# Patient Record
Sex: FEMALE | Race: WHITE | NOT HISPANIC OR LATINO | Employment: UNEMPLOYED | URBAN - METROPOLITAN AREA
[De-identification: names, ages, dates, MRNs, and addresses within clinical notes are randomized per-mention and may not be internally consistent; named-entity substitution may affect disease eponyms.]

---

## 2020-01-13 ENCOUNTER — TELEPHONE (OUTPATIENT)
Dept: NEUROLOGY | Facility: CLINIC | Age: 55
End: 2020-01-13

## 2020-01-13 ENCOUNTER — TRANSCRIBE ORDERS (OUTPATIENT)
Dept: NEUROLOGY | Facility: CLINIC | Age: 55
End: 2020-01-13

## 2020-01-13 DIAGNOSIS — R51.9 HEADACHE: Primary | ICD-10-CM

## 2020-01-13 NOTE — TELEPHONE ENCOUNTER
Called patient to see if she would be available to come in tomorrow January 14 at 9 to see Dr Evalee Gowers  Patient came in the office today and stated that she needed a sooner appointment  No answer  Left voicemail  The spot is currently on hold if the patient is available to come in

## 2020-01-14 ENCOUNTER — CONSULT (OUTPATIENT)
Dept: NEUROLOGY | Facility: CLINIC | Age: 55
End: 2020-01-14
Payer: COMMERCIAL

## 2020-01-14 VITALS
DIASTOLIC BLOOD PRESSURE: 78 MMHG | HEIGHT: 70 IN | BODY MASS INDEX: 35.79 KG/M2 | SYSTOLIC BLOOD PRESSURE: 120 MMHG | HEART RATE: 72 BPM | WEIGHT: 250 LBS

## 2020-01-14 DIAGNOSIS — M54.42 CHRONIC BILATERAL LOW BACK PAIN WITH BILATERAL SCIATICA: ICD-10-CM

## 2020-01-14 DIAGNOSIS — G44.89 OTHER HEADACHE SYNDROME: Primary | ICD-10-CM

## 2020-01-14 DIAGNOSIS — R51.9 HEADACHE: ICD-10-CM

## 2020-01-14 DIAGNOSIS — G89.29 CHRONIC BILATERAL LOW BACK PAIN WITH BILATERAL SCIATICA: ICD-10-CM

## 2020-01-14 DIAGNOSIS — M54.12 CERVICAL RADICULOPATHY: ICD-10-CM

## 2020-01-14 DIAGNOSIS — M54.41 CHRONIC BILATERAL LOW BACK PAIN WITH BILATERAL SCIATICA: ICD-10-CM

## 2020-01-14 PROBLEM — M54.40 CHRONIC BILATERAL LOW BACK PAIN WITH SCIATICA: Status: ACTIVE | Noted: 2020-01-14

## 2020-01-14 PROCEDURE — 99204 OFFICE O/P NEW MOD 45 MIN: CPT | Performed by: PSYCHIATRY & NEUROLOGY

## 2020-01-14 RX ORDER — EZETIMIBE 10 MG/1
1 TABLET ORAL DAILY
COMMUNITY
Start: 2014-12-17

## 2020-01-14 RX ORDER — MELOXICAM 15 MG/1
1 TABLET ORAL DAILY
COMMUNITY
Start: 2020-01-06 | End: 2020-03-05 | Stop reason: SINTOL

## 2020-01-14 RX ORDER — LEVOTHYROXINE SODIUM 0.12 MG/1
1 TABLET ORAL DAILY
COMMUNITY
Start: 2014-11-16

## 2020-01-14 RX ORDER — GABAPENTIN 100 MG/1
100 CAPSULE ORAL
Qty: 30 CAPSULE | Refills: 4 | Status: SHIPPED | OUTPATIENT
Start: 2020-01-14 | End: 2020-06-15

## 2020-01-14 RX ORDER — ROSUVASTATIN CALCIUM 10 MG/1
1 TABLET, COATED ORAL DAILY
COMMUNITY
Start: 2014-12-17
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
## 2020-01-20 ENCOUNTER — PROCEDURE VISIT (OUTPATIENT)
Dept: NEUROLOGY | Facility: CLINIC | Age: 55
End: 2020-01-20
Payer: COMMERCIAL

## 2020-01-20 DIAGNOSIS — M54.12 CERVICAL RADICULOPATHY: ICD-10-CM

## 2020-01-20 PROCEDURE — 95910 NRV CNDJ TEST 7-8 STUDIES: CPT | Performed by: PHYSICAL MEDICINE & REHABILITATION
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
## 2020-01-28 ENCOUNTER — EVALUATION (OUTPATIENT)
Dept: PHYSICAL THERAPY | Facility: CLINIC | Age: 55
End: 2020-01-28
Payer: COMMERCIAL

## 2020-01-28 ENCOUNTER — HOSPITAL ENCOUNTER (OUTPATIENT)
Dept: MRI IMAGING | Facility: CLINIC | Age: 55
Discharge: HOME | End: 2020-01-28
Payer: COMMERCIAL

## 2020-01-28 DIAGNOSIS — M54.42 CHRONIC BILATERAL LOW BACK PAIN WITH BILATERAL SCIATICA: ICD-10-CM

## 2020-01-28 DIAGNOSIS — G44.89 OTHER HEADACHE SYNDROME: ICD-10-CM

## 2020-01-28 DIAGNOSIS — M54.41 CHRONIC BILATERAL LOW BACK PAIN WITH BILATERAL SCIATICA: ICD-10-CM

## 2020-01-28 DIAGNOSIS — G89.29 CHRONIC BILATERAL LOW BACK PAIN WITH BILATERAL SCIATICA: ICD-10-CM

## 2020-01-28 DIAGNOSIS — M54.12 CERVICAL RADICULOPATHY: Primary | ICD-10-CM

## 2020-01-28 DIAGNOSIS — M54.12 CERVICAL RADICULOPATHY: ICD-10-CM

## 2020-01-28 PROCEDURE — 70551 MRI BRAIN STEM W/O DYE: CPT

## 2020-01-28 PROCEDURE — 97140 MANUAL THERAPY 1/> REGIONS: CPT | Performed by: PHYSICAL THERAPIST

## 2020-01-28 PROCEDURE — 97161 PT EVAL LOW COMPLEX 20 MIN: CPT | Performed by: PHYSICAL THERAPIST

## 2020-01-28 PROCEDURE — 72141 MRI NECK SPINE W/O DYE: CPT
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
## 2020-02-03 ENCOUNTER — OFFICE VISIT (OUTPATIENT)
Dept: PHYSICAL THERAPY | Facility: CLINIC | Age: 55
End: 2020-02-03
Payer: COMMERCIAL

## 2020-02-03 DIAGNOSIS — M54.42 CHRONIC BILATERAL LOW BACK PAIN WITH BILATERAL SCIATICA: ICD-10-CM

## 2020-02-03 DIAGNOSIS — M54.41 CHRONIC BILATERAL LOW BACK PAIN WITH BILATERAL SCIATICA: ICD-10-CM

## 2020-02-03 DIAGNOSIS — G89.29 CHRONIC BILATERAL LOW BACK PAIN WITH BILATERAL SCIATICA: ICD-10-CM

## 2020-02-03 DIAGNOSIS — M54.12 CERVICAL RADICULOPATHY: Primary | ICD-10-CM

## 2020-02-03 PROCEDURE — 97140 MANUAL THERAPY 1/> REGIONS: CPT | Performed by: PHYSICAL THERAPIST

## 2020-02-03 PROCEDURE — 97112 NEUROMUSCULAR REEDUCATION: CPT | Performed by: PHYSICAL THERAPIST

## 2020-02-03 NOTE — PROGRESS NOTES
Daily Note     Today's date: 2/3/2020  Patient name: Corin Hicks  : 1965  MRN: [id number]  Referring provider: Danae Wise MD  Dx:   Encounter Diagnosis     ICD-10-CM    1  Cervical radiculopathy M54 12    2  Chronic bilateral low back pain with bilateral sciatica M54 42     M54 41     G89 29                   Subjective: Pt reports that she was very sore after her IE that she was waking up t/o the night  Objective: See treatment diary below  Repeated cervical retraction: NE   Repeated cervical retraction ext: NE     DNF: 11s  Craniocervical flexion test: 22 mmHG, 24 mmHG, unable to hold at 26 mmHG    Assessment: Assessed repeated cervical movement which had no effect on patients symptoms, sx location, or comparable sign of L lateral flexion and R rotation  Pt demonstrated difficulty with activation of the deep cervical neck flexors and was very fatigued with strengthening  Pt was instructed on a HEP that focused on improving coordination of the deep neck flexors and periscapular musculature to improve posture  Plan: Continue per plan of care  Progress treatment as tolerated         Precautions: depression       Manual  1/28 2/3           Prone thoracic mobilizations KB gr 2            STM cspine paraspinals & UT  KB                                                       Exercise Diary  1/28 2/3                        Thoracic ext over a chair 10s x10 10s x10           DNF   3x 10s           scap retraction c 6s holds  x20                                                                                                                                                                                                                               Modalities  1/28 2/3

## 2020-02-04 ENCOUNTER — TELEPHONE (OUTPATIENT)
Dept: NEUROLOGY | Facility: CLINIC | Age: 55
End: 2020-02-04

## 2020-02-07 ENCOUNTER — OFFICE VISIT (OUTPATIENT)
Dept: PHYSICAL THERAPY | Facility: CLINIC | Age: 55
End: 2020-02-07
Payer: COMMERCIAL

## 2020-02-07 DIAGNOSIS — M54.12 CERVICAL RADICULOPATHY: Primary | ICD-10-CM

## 2020-02-07 DIAGNOSIS — M54.42 CHRONIC BILATERAL LOW BACK PAIN WITH BILATERAL SCIATICA: ICD-10-CM

## 2020-02-07 DIAGNOSIS — G89.29 CHRONIC BILATERAL LOW BACK PAIN WITH BILATERAL SCIATICA: ICD-10-CM

## 2020-02-07 DIAGNOSIS — M54.41 CHRONIC BILATERAL LOW BACK PAIN WITH BILATERAL SCIATICA: ICD-10-CM

## 2020-02-07 PROCEDURE — 97140 MANUAL THERAPY 1/> REGIONS: CPT | Performed by: PHYSICAL THERAPIST

## 2020-02-07 PROCEDURE — 97110 THERAPEUTIC EXERCISES: CPT | Performed by: PHYSICAL THERAPIST

## 2020-02-07 NOTE — PROGRESS NOTES
Daily Note     Today's date: 2020  Patient name: Katelyn Lopez  : 1965  MRN: [id number]  Referring provider: Amanda Crawford MD  Dx:   Encounter Diagnosis     ICD-10-CM    1  Cervical radiculopathy M54 12    2  Chronic bilateral low back pain with bilateral sciatica M54 42     M54 41     G89 29                   Subjective: Pt notes that her headaches have reduced and that she is starting to feel better  Objective: See treatment diary below      Assessment: Pt continued to have some spasms in the paraspinal musculature which reduced with soft tissue mobilization  She was able to increase the amount of time she was able to hold her DNF strengthening without loss of neck crease during exercise  Continue to progress DNF strengthening and periscapular musculature  Plan: Continue per plan of care  Progress treatment as tolerated         Precautions: depression       Manual  1/28 2/3 2/7          Prone thoracic mobilizations KB gr 2            STM cspine paraspinals & UT  KB  KB          SO release    KB                                        Exercise Diary  1/28 2/3 2/7                       Thoracic ext over a chair 10s x10 10s x10 10s x10          DNF   3x 10s 10s, 15s x2          scap retraction c 6s holds  x20 x20          Low trap ER   10sx10          TB rows    NV                                                                                                                                                                                                    Modalities  1/28 2/3 2/7

## 2020-02-10 ENCOUNTER — OFFICE VISIT (OUTPATIENT)
Dept: PHYSICAL THERAPY | Facility: CLINIC | Age: 55
End: 2020-02-10
Payer: COMMERCIAL

## 2020-02-10 DIAGNOSIS — G89.29 CHRONIC BILATERAL LOW BACK PAIN WITH BILATERAL SCIATICA: ICD-10-CM

## 2020-02-10 DIAGNOSIS — M54.41 CHRONIC BILATERAL LOW BACK PAIN WITH BILATERAL SCIATICA: ICD-10-CM

## 2020-02-10 DIAGNOSIS — M54.12 CERVICAL RADICULOPATHY: Primary | ICD-10-CM

## 2020-02-10 DIAGNOSIS — M54.42 CHRONIC BILATERAL LOW BACK PAIN WITH BILATERAL SCIATICA: ICD-10-CM

## 2020-02-10 PROCEDURE — 97140 MANUAL THERAPY 1/> REGIONS: CPT | Performed by: PHYSICAL THERAPIST

## 2020-02-10 PROCEDURE — 97110 THERAPEUTIC EXERCISES: CPT | Performed by: PHYSICAL THERAPIST

## 2020-02-10 NOTE — PROGRESS NOTES
Daily Note     Today's date: 2/10/2020  Patient name: Jb Weir  : 1965  MRN: [id number]  Referring provider: Levon Sorensen MD  Dx:   Encounter Diagnosis     ICD-10-CM    1  Cervical radiculopathy M54 12    2  Chronic bilateral low back pain with bilateral sciatica M54 42     M54 41     G89 29                   Subjective: Patient states she feels that her C/S is improving since beginning PT treatment  Objective: See treatment diary below      Assessment: Patient performed progression of increased hold/repetitions with DNF with moderate challenge, no c/o pain with activity; no c/o pain with addition of TB rows  Plan: Continue per plan of care  Progress treatment as tolerated         Precautions: depression       Manual  1/28 2/3 2/7 2/10         Prone thoracic mobilizations KB gr 2            STM cspine paraspinals & UT  KB  KB KK         SO release    KB KK                                       Exercise Diary  1/28 2/3 2/7 2/10                      Thoracic ext over a chair 10s x10 10s x10 10s x10 10s x10         DNF   3x 10s 10s, 15s x2 15s x 10         scap retraction c 6s holds  x20 x20 x20         Low trap ER   10sx10 RTB 20x         TB rows    NV RTB 20x                                                                                                                                                                                                   Modalities  1/28 2/3 2/7

## 2020-02-14 ENCOUNTER — OFFICE VISIT (OUTPATIENT)
Dept: PHYSICAL THERAPY | Facility: CLINIC | Age: 55
End: 2020-02-14
Payer: COMMERCIAL

## 2020-02-14 DIAGNOSIS — M54.42 CHRONIC BILATERAL LOW BACK PAIN WITH BILATERAL SCIATICA: ICD-10-CM

## 2020-02-14 DIAGNOSIS — G89.29 CHRONIC BILATERAL LOW BACK PAIN WITH BILATERAL SCIATICA: ICD-10-CM

## 2020-02-14 DIAGNOSIS — M54.41 CHRONIC BILATERAL LOW BACK PAIN WITH BILATERAL SCIATICA: ICD-10-CM

## 2020-02-14 DIAGNOSIS — M54.12 CERVICAL RADICULOPATHY: Primary | ICD-10-CM

## 2020-02-14 PROCEDURE — 97110 THERAPEUTIC EXERCISES: CPT | Performed by: PHYSICAL THERAPIST

## 2020-02-14 PROCEDURE — 97140 MANUAL THERAPY 1/> REGIONS: CPT | Performed by: PHYSICAL THERAPIST

## 2020-02-14 PROCEDURE — 97112 NEUROMUSCULAR REEDUCATION: CPT | Performed by: PHYSICAL THERAPIST

## 2020-02-14 NOTE — PROGRESS NOTES
Daily Note     Today's date: 2020  Patient name: Samina Cross  : 1965  MRN: [id number]  Referring provider: Aimee Handy MD  Dx:   Encounter Diagnosis     ICD-10-CM    1  Cervical radiculopathy M54 12    2  Chronic bilateral low back pain with bilateral sciatica M54 42     M54 41     G89 29                   Subjective: Patient reports that she has been feeling better, denies     Objective: See treatment diary below      Assessment: addition of low row shoulder extension today provided without increase in symptoms  Patient pain was well controlled throughout session  Did require verbal and tactile cueing to activation periscapular musculature during scapular exercise  Plan: Continue per plan of care        Precautions: depression       Manual  1/28 2/3 2/7 2/10 2/14        Prone thoracic mobilizations KB gr 2            STM cspine paraspinals & UT  KB  KB KK GM        SO release    KB KK GM                                      Exercise Diary  1/28 2/3 2/7 2/10 2/14                     Thoracic ext over a chair 10s x10 10s x10 10s x10 10s x10 10sx10        DNF   3x 10s 10s, 15s x2 15s x 10 15sx10        scap retraction c 6s holds  x20 x20 x20 20x        Low trap ER   10sx10 RTB 20x rtb 20x        TB rows    NV RTB 20x rtb 20x        TB ext     rtb 20x                                                                                                                                                                                     Modalities  1/28 2/3 2/7

## 2020-02-18 ENCOUNTER — OFFICE VISIT (OUTPATIENT)
Dept: PHYSICAL THERAPY | Facility: CLINIC | Age: 55
End: 2020-02-18
Payer: COMMERCIAL

## 2020-02-18 DIAGNOSIS — M54.42 CHRONIC BILATERAL LOW BACK PAIN WITH BILATERAL SCIATICA: ICD-10-CM

## 2020-02-18 DIAGNOSIS — G89.29 CHRONIC BILATERAL LOW BACK PAIN WITH BILATERAL SCIATICA: ICD-10-CM

## 2020-02-18 DIAGNOSIS — M54.41 CHRONIC BILATERAL LOW BACK PAIN WITH BILATERAL SCIATICA: ICD-10-CM

## 2020-02-18 DIAGNOSIS — M54.12 CERVICAL RADICULOPATHY: Primary | ICD-10-CM

## 2020-02-18 PROCEDURE — 97112 NEUROMUSCULAR REEDUCATION: CPT | Performed by: PHYSICAL THERAPIST

## 2020-02-18 PROCEDURE — 97140 MANUAL THERAPY 1/> REGIONS: CPT | Performed by: PHYSICAL THERAPIST

## 2020-02-18 PROCEDURE — 97110 THERAPEUTIC EXERCISES: CPT | Performed by: PHYSICAL THERAPIST

## 2020-02-18 NOTE — PROGRESS NOTES
Daily Note     Today's date: 2020  Patient name: Nazia Parks  : 1965  MRN: [id number]  Referring provider: Nohemi Graves MD  Dx:   Encounter Diagnosis     ICD-10-CM    1  Cervical radiculopathy M54 12    2  Chronic bilateral low back pain with bilateral sciatica M54 42     M54 41     G89 29                   Subjective: Patient reports that she felt good after last session  Objective: See treatment diary below      Assessment: Pt demonstrated the ability to perform periscapular strengthening without UT compensations  She was able to increase the time she was able to hold DNF but had to decrease the reps  She was able to progress to seated DNF strengthening without reproduction of sxs  Instructed pt to increase her DNF time at home  Plan: Continue per plan of care        Precautions: depression       Manual  1/28 2/3 2/7 2/10 2/14 2/18       Prone thoracic mobilizations KB gr 2            STM cspine paraspinals & UT  KB  KB KK GM KB       SO release    KB KK GM KB                                     Exercise Diary  1/28 2/3 2/7 2/10 2/14 2/18       UBE backwards      x5'       Thoracic ext over a chair 10s x10 10s x10 10s x10 10s x10 10sx10 10sx10       DNF   3x 10s 10s, 15s x2 15s x 10 15sx10 20sx5       scap retraction c 6s holds  x20 x20 x20 20x HEP       Low trap ER   10sx10 RTB 20x rtb 20x rtb 20x       TB rows    NV RTB 20x rtb 20x rtb 20x       TB ext     rtb 20x rtb 20x       Cervical retraction into Tband      rtb 20x c 6 holds                                                                                                                                                                       Modalities  1/28 2/3 2/7   2/18

## 2020-02-21 ENCOUNTER — OFFICE VISIT (OUTPATIENT)
Dept: PHYSICAL THERAPY | Facility: CLINIC | Age: 55
End: 2020-02-21
Payer: COMMERCIAL

## 2020-02-21 DIAGNOSIS — M54.12 CERVICAL RADICULOPATHY: Primary | ICD-10-CM

## 2020-02-21 DIAGNOSIS — G89.29 CHRONIC BILATERAL LOW BACK PAIN WITH BILATERAL SCIATICA: ICD-10-CM

## 2020-02-21 DIAGNOSIS — M54.42 CHRONIC BILATERAL LOW BACK PAIN WITH BILATERAL SCIATICA: ICD-10-CM

## 2020-02-21 DIAGNOSIS — M54.41 CHRONIC BILATERAL LOW BACK PAIN WITH BILATERAL SCIATICA: ICD-10-CM

## 2020-02-21 PROCEDURE — 97140 MANUAL THERAPY 1/> REGIONS: CPT | Performed by: PHYSICAL THERAPIST

## 2020-02-21 PROCEDURE — 97112 NEUROMUSCULAR REEDUCATION: CPT | Performed by: PHYSICAL THERAPIST

## 2020-02-21 PROCEDURE — 97110 THERAPEUTIC EXERCISES: CPT | Performed by: PHYSICAL THERAPIST

## 2020-02-21 NOTE — PROGRESS NOTES
Daily Note     Today's date: 2020  Patient name: Esha Galarza  : 1965  MRN: [id number]  Referring provider: Tereso Gautam MD  Dx:   Encounter Diagnosis     ICD-10-CM    1  Cervical radiculopathy M54 12    2  Chronic bilateral low back pain with bilateral sciatica M54 42     M54 41     G89 29                   Subjective: Patient notes that she has not had tingling in almost 2 weeks  Objective: See treatment diary below    Assessment: Pt was fatigued with periscapular strengthening and DNF strengthening  She presented with moderate amount of rounded shoulder during supine soft tissue work  Added in STM to pec minor and doorway stretching in order to decrease amount of rounded shoulder posturing patient has  Additionally instructed patient on postural re-ed with lumbar roll to inhibit rounded posture  Pt verbalized and demonstrated understanding  Plan: Continue per plan of care        Precautions: depression       Manual  1/28 2/3 2/7 2/10 2/14 2/18 2/21      Prone thoracic mobilizations KB gr 2            STM cspine paraspinals & UT  KB  KB KK GM KB KB      SO release    KB KK GM KB KB      pec minor STM                               Exercise Diary  1/28 2/3 2/7 2/10 2/14 2/18 2/21      UBE backwards      x5' x6'      Thoracic ext over a chair 10s x10 10s x10 10s x10 10s x10 10sx10 10sx10 10sx10      DNF   3x 10s 10s, 15s x2 15s x 10 15sx10 20sx5 20sx6      scap retraction c 6s holds  x20 x20 x20 20x HEP       Low trap ER   10sx10 RTB 20x rtb 20x rtb 20x rtb 20x      TB rows    NV RTB 20x rtb 20x rtb 20x rtb 20x      TB ext     rtb 20x rtb 20x rtb 20x      Cervical retraction into Tband      rtb 20x c 6 holds rtb 20x c 6 holds      Postural re-ed with lumbar roll        x5 min       Doorway pec stretch        3x30s                                                                                                                                            Modalities  1/28 2/3 2/7   2/18 2/21

## 2020-02-25 ENCOUNTER — EVALUATION (OUTPATIENT)
Dept: PHYSICAL THERAPY | Facility: CLINIC | Age: 55
End: 2020-02-25
Payer: COMMERCIAL

## 2020-02-25 DIAGNOSIS — M54.12 CERVICAL RADICULOPATHY: Primary | ICD-10-CM

## 2020-02-25 DIAGNOSIS — G89.29 CHRONIC BILATERAL LOW BACK PAIN WITH BILATERAL SCIATICA: ICD-10-CM

## 2020-02-25 DIAGNOSIS — M54.42 CHRONIC BILATERAL LOW BACK PAIN WITH BILATERAL SCIATICA: ICD-10-CM

## 2020-02-25 DIAGNOSIS — M54.41 CHRONIC BILATERAL LOW BACK PAIN WITH BILATERAL SCIATICA: ICD-10-CM

## 2020-02-25 PROCEDURE — 97112 NEUROMUSCULAR REEDUCATION: CPT | Performed by: PHYSICAL THERAPIST

## 2020-02-25 PROCEDURE — 97140 MANUAL THERAPY 1/> REGIONS: CPT | Performed by: PHYSICAL THERAPIST

## 2020-02-25 PROCEDURE — 97110 THERAPEUTIC EXERCISES: CPT | Performed by: PHYSICAL THERAPIST

## 2020-02-25 NOTE — PROGRESS NOTES
Daily Note & Re-Eval    Today's date: 2020  Patient name: Aretha Carpenter  : 1965  MRN: [id number]  Referring provider: Skyler Warner MD  Dx:   Encounter Diagnosis     ICD-10-CM    1  Cervical radiculopathy M54 12    2  Chronic bilateral low back pain with bilateral sciatica M54 42     M54 41     G89 29                   Assessment  Assessment details: Aretha Carpenter is a pleasant 47 y o  female who presents with neck pain and headaches  The patient's greatest concerns are worry over not knowing what's wrong, concern at no signs of improvement, wanting to avoid painkillers, wanting to avoid surgery and fear of not being able to keep active  Pt has demonstrated improvement thus far as demonstrated by improved FOTO scores, reduction in pain/headaches/& N/T, and improvement in motion  She additionally has improved her strength but continues to have strength deficits in the deep cervical neck flexors  She continues to have difficulty with her work responsibilities  She would continue to benefit from skilled PT to address her deficits in order to maximize her LOF and to assess her low back pain  Goals  ST  Independent with HEP in 2 weeks - MET   2  Pt will have verbal report of improvement in symptoms by >/=25% in 2 weeks - MET    LT  Pt will be able to perform work duties with little to no difficulty by time of d/ c - NOT MET   2  Pt will be able to sleep through the night without disturbances of neck pain by time of d/c - MET   3  Pt will be able to independently correct posture for independent management of sxs by time of d/c - PARTIALLY MET     Plan  Plan details: Prognosis above is given PT services 2x/week tapering to 1x/week over the next 2 months and home program adherence    Patient would benefit from: skilled physical therapy  Planned modality interventions: thermotherapy: hydrocollator packs  Planned therapy interventions: activity modification, joint mobilization, manual therapy, motor coordination training, neuromuscular re-education, patient education, self care, therapeutic activities, therapeutic exercise, graded activity, home exercise program, behavior modification and postural training  Frequency: 2x week  Plan of Care beginning date: 1/28/2020  Treatment plan discussed with: patient        Subjective Evaluation    History of Present Illness  Mechanism of injury: Pt notes that she is feeling about 70% better in the neck but is still having low back issues and difficulty with her work respirabilities  Treatments  Previous treatment: medication  Patient Goals  Patient goals for therapy: decreased pain  Patient goal: be able to sleep through the night, be able to vacuum         Objective     Concurrent Complaints  Positive for disturbed sleep, dizziness, headaches and tinnitus   Negative for night pain, faints, nausea/motion sickness, trouble swallowing, difficulty breathing and shortness of breath    Postural Observations  Seated posture: fair  Standing posture: fair  Correction of posture: makes symptoms better        Neurological Testing     Sensation   Cervical/Thoracic   Left   Intact: light touch    Right   Intact: light touch    Reflexes   Left   Biceps (C5/C6): normal (2+)  Brachioradialis (C6): normal (2+)  Triceps (C7): trace (1+)    Right   Biceps (C5/C6): normal (2+)  Brachioradialis (C6): normal (2+)  Triceps (C7): trace (1+)    Active Range of Motion   Cervical/Thoracic Spine       Cervical  Subcranial protraction:  WNL  Subcranial retraction: Active cervical subcranial retraction: no pain   WFL   Flexion:  WFL  Extension:  Restriction level: min  Left lateral flexion:  Restriction level: minimal  Right lateral flexion:   Restriction level minimal  Left rotation:  Restriction level: minimal  Right rotation:   Restriction level: minimal    Joint Play     Hypomobile: C7, T1, T2, T3, T4, T5, T6, T7 and T8         Strength/Myotome Testing   Cervical Spine Left   Normal strength    Right   Normal strength    Additional Strength Details  Normal myotome strength     Tests     Left Shoulder   Positive ULTT1  Right Shoulder   Positive ULTT1     Neuro Exam:     Headaches   Patient reports headaches: No          Precautions: depression       Manual  1/28 2/3 2/7 2/10 2/14 2/18 2/21 2/25     Prone thoracic mobilizations KB gr 2            STM cspine paraspinals & UT  KB  KB KK GM KB KB      SO release    KB KK GM KB KB KB     pec minor STM              Med n glides         KB         Exercise Diary  1/28 2/3 2/7 2/10 2/14 2/18 2/21 2/25     UBE backwards      x5' x6' x8'     Thoracic ext over a chair 10s x10 10s x10 10s x10 10s x10 10sx10 10sx10 10sx10 10sx10     DNF   3x 10s 10s, 15s x2 15s x 10 15sx10 20sx5 20sx6 25sx6     scap retraction c 6s holds  x20 x20 x20 20x HEP       Low trap ER   10sx10 RTB 20x rtb 20x rtb 20x rtb 20x RTB 10s x20     TB rows    NV RTB 20x rtb 20x rtb 20x rtb 20x Gr x30     TB ext     rtb 20x rtb 20x rtb 20x rtb 30x     Cervical retraction into Tband      rtb 20x c 6 holds rtb 20x c 6 holds rtb 30x c 6 holds     Postural re-ed with lumbar roll        x5 min       Doorway pec stretch        3x30s 3x30s     TB robbers        YTB 2x10                                                                                                                              Modalities  1/28 2/3 2/7   2/18 2/21 2/25

## 2020-02-28 ENCOUNTER — OFFICE VISIT (OUTPATIENT)
Dept: PHYSICAL THERAPY | Facility: CLINIC | Age: 55
End: 2020-02-28
Payer: COMMERCIAL

## 2020-02-28 DIAGNOSIS — M54.41 CHRONIC BILATERAL LOW BACK PAIN WITH BILATERAL SCIATICA: ICD-10-CM

## 2020-02-28 DIAGNOSIS — M54.12 CERVICAL RADICULOPATHY: Primary | ICD-10-CM

## 2020-02-28 DIAGNOSIS — M54.42 CHRONIC BILATERAL LOW BACK PAIN WITH BILATERAL SCIATICA: ICD-10-CM

## 2020-02-28 DIAGNOSIS — G89.29 CHRONIC BILATERAL LOW BACK PAIN WITH BILATERAL SCIATICA: ICD-10-CM

## 2020-02-28 PROCEDURE — 97112 NEUROMUSCULAR REEDUCATION: CPT | Performed by: PHYSICAL THERAPIST

## 2020-02-28 PROCEDURE — 97110 THERAPEUTIC EXERCISES: CPT | Performed by: PHYSICAL THERAPIST

## 2020-02-28 PROCEDURE — 97530 THERAPEUTIC ACTIVITIES: CPT | Performed by: PHYSICAL THERAPIST

## 2020-02-28 NOTE — PROGRESS NOTES
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 and gets a lot of pain when she has to do it  Objective: See treatment diary below      Assessment: Went over body mechanics with patient in order to reduce strain on her neck when she is performing transfers at work  Pt verbalized and demonstrated understanding of how to perform transfers with appropriate body mechanics  Mild neural tension with median n which reduced with med n glides  Plan: Continue per plan of care  Progress treatment as tolerated         Precautions: depression       Manual  1/28 2/3 2/7 2/10 2/14 2/18 2/21 2/25 2/28    Prone thoracic mobilizations KB gr 2            STM cspine paraspinals & UT  KB  KB KK GM KB KB      SO release    KB KK GM KB KB KB     pec minor STM              Med n glides         KB KB        Exercise Diary  1/28 2/3 2/7 2/10 2/14 2/18 2/21 2/25 2/28    UBE backwards      x5' x6' x8' x10'    Thoracic ext over a chair 10s x10 10s x10 10s x10 10s x10 10sx10 10sx10 10sx10 10sx10 10sx10    DNF   3x 10s 10s, 15s x2 15s x 10 15sx10 20sx5 20sx6 25sx6 30sx6    scap retraction c 6s holds  x20 x20 x20 20x HEP       Low trap ER   10sx10 RTB 20x rtb 20x rtb 20x rtb 20x RTB 10s x20 RTB 10s x20 c ret    TB rows    NV RTB 20x rtb 20x rtb 20x rtb 20x Gr x30 Gr x30    TB ext     rtb 20x rtb 20x rtb 20x rtb 30x Gr x30    Cervical retraction into Tband      rtb 20x c 6 holds rtb 20x c 6 holds rtb 30x c 6 holds rtb 30x c 6 holds    Postural re-ed with lumbar roll        x5 min       Doorway pec stretch        3x30s 3x30s 3x30s    TB terra        YTB 2x10 YTB 2x10 Med n mey          x15    Body mechanics for transfers         x10'                                                                                                   Modalities  1/28 2/3 2/7   2/18 2/21 2/25 2/28

## 2020-03-02 ENCOUNTER — OFFICE VISIT (OUTPATIENT)
Dept: PHYSICAL THERAPY | Facility: CLINIC | Age: 55
End: 2020-03-02
Payer: COMMERCIAL

## 2020-03-02 DIAGNOSIS — G89.29 CHRONIC BILATERAL LOW BACK PAIN WITH BILATERAL SCIATICA: ICD-10-CM

## 2020-03-02 DIAGNOSIS — M54.41 CHRONIC BILATERAL LOW BACK PAIN WITH BILATERAL SCIATICA: ICD-10-CM

## 2020-03-02 DIAGNOSIS — M54.12 CERVICAL RADICULOPATHY: Primary | ICD-10-CM

## 2020-03-02 DIAGNOSIS — M54.42 CHRONIC BILATERAL LOW BACK PAIN WITH BILATERAL SCIATICA: ICD-10-CM

## 2020-03-02 PROCEDURE — 97112 NEUROMUSCULAR REEDUCATION: CPT | Performed by: PHYSICAL THERAPIST

## 2020-03-02 PROCEDURE — 97140 MANUAL THERAPY 1/> REGIONS: CPT | Performed by: PHYSICAL THERAPIST

## 2020-03-02 PROCEDURE — 97110 THERAPEUTIC EXERCISES: CPT | Performed by: PHYSICAL THERAPIST

## 2020-03-02 NOTE — PROGRESS NOTES
Daily Note     Today's date: 3/2/2020  Patient name: Aretha Carpenter  : 1965  MRN: [id number]  Referring provider: Skyler Warner MD  Dx:   Encounter Diagnosis     ICD-10-CM    1  Cervical radiculopathy M54 12    2  Chronic bilateral low back pain with bilateral sciatica M54 42     M54 41     G89 29                   Subjective: Pt notes that she woke up in a lot of pain yesterday  And that it radiates from her head to her shoulder  Objective: See treatment diary below      Assessment: Pt had reduction in sxs with repeated retraction with therapist OP  Required soft tissue mobilization to further reduce sx  Did not complete rest of program secondary to high irritability  Plan: Continue per plan of care  Progress treatment as tolerated         Precautions: depression       Manual  1/28 2/3 2/7 2/10 2/14 2/18 2/21 2/25 2/28 3/2   Prone thoracic mobilizations KB gr 2            STM cspine paraspinals & UT  KB  KB KK GM KB KB   KB   SO release    KB KK GM KB KB KB     pec minor STM              Med n glides         KB KB        Exercise Diary  1/28 2/3 2/7 2/10 2/14 2/18 2/21 2/25 2/28 3/2   UBE backwards      x5' x6' x8' x10' x10'   Thoracic ext over a chair 10s x10 10s x10 10s x10 10s x10 10sx10 10sx10 10sx10 10sx10 10sx10 10sx10   DNF   3x 10s 10s, 15s x2 15s x 10 15sx10 20sx5 20sx6 25sx6 30sx6 np   scap retraction c 6s holds  x20 x20 x20 20x HEP       Low trap ER   10sx10 RTB 20x rtb 20x rtb 20x rtb 20x RTB 10s x20 RTB 10s x20 c ret np   TB rows    NV RTB 20x rtb 20x rtb 20x rtb 20x Gr x30 Gr x30 Gr x30   TB ext     rtb 20x rtb 20x rtb 20x rtb 30x Gr x30 Gr x30   Cervical retraction into Tband      rtb 20x c 6 holds rtb 20x c 6 holds rtb 30x c 6 holds rtb 30x c 6 holds 4x15 without band    Postural re-ed with lumbar roll        x5 min       Doorway pec stretch        3x30s 3x30s 3x30s 3x30s   TB terra        YTB 2x10 YTB 2x10 YTB 2x10   Med n glides          x15 np   Body mechanics for transfers         x10' np                                                                                                  Modalities  1/28 2/3 2/7   2/18 2/21 2/25 2/28 3/2

## 2020-03-04 ENCOUNTER — TELEPHONE (OUTPATIENT)
Dept: NEUROLOGY | Facility: CLINIC | Age: 55
End: 2020-03-04

## 2020-03-04 NOTE — TELEPHONE ENCOUNTER
Called patient to see if she would be available to come in tomorrow March 5 at 10 to see Dr Niall Ponce  Patient is on wait list for sooner appointment  No answer  Left voicemail

## 2020-03-05 ENCOUNTER — OFFICE VISIT (OUTPATIENT)
Dept: NEUROLOGY | Facility: CLINIC | Age: 55
End: 2020-03-05
Payer: COMMERCIAL

## 2020-03-05 VITALS
WEIGHT: 239 LBS | HEIGHT: 70 IN | BODY MASS INDEX: 34.22 KG/M2 | DIASTOLIC BLOOD PRESSURE: 84 MMHG | SYSTOLIC BLOOD PRESSURE: 112 MMHG | HEART RATE: 78 BPM

## 2020-03-05 DIAGNOSIS — M54.12 CERVICAL RADICULOPATHY: ICD-10-CM

## 2020-03-05 DIAGNOSIS — G89.29 CHRONIC BILATERAL LOW BACK PAIN WITH BILATERAL SCIATICA: ICD-10-CM

## 2020-03-05 DIAGNOSIS — G44.89 OTHER HEADACHE SYNDROME: Primary | ICD-10-CM

## 2020-03-05 DIAGNOSIS — M54.41 CHRONIC BILATERAL LOW BACK PAIN WITH BILATERAL SCIATICA: ICD-10-CM

## 2020-03-05 DIAGNOSIS — M54.42 CHRONIC BILATERAL LOW BACK PAIN WITH BILATERAL SCIATICA: ICD-10-CM

## 2020-03-05 PROCEDURE — 99214 OFFICE O/P EST MOD 30 MIN: CPT | Performed by: PSYCHIATRY & NEUROLOGY

## 2020-03-05 RX ORDER — MULTIVIT WITH MINERALS/LUTEIN
3000 TABLET ORAL 2 TIMES DAILY
COMMUNITY
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
## 2020-03-06 ENCOUNTER — OFFICE VISIT (OUTPATIENT)
Dept: PHYSICAL THERAPY | Facility: CLINIC | Age: 55
End: 2020-03-06
Payer: COMMERCIAL

## 2020-03-06 DIAGNOSIS — G89.29 CHRONIC BILATERAL LOW BACK PAIN WITH BILATERAL SCIATICA: ICD-10-CM

## 2020-03-06 DIAGNOSIS — M54.12 CERVICAL RADICULOPATHY: Primary | ICD-10-CM

## 2020-03-06 DIAGNOSIS — M54.41 CHRONIC BILATERAL LOW BACK PAIN WITH BILATERAL SCIATICA: ICD-10-CM

## 2020-03-06 DIAGNOSIS — M54.42 CHRONIC BILATERAL LOW BACK PAIN WITH BILATERAL SCIATICA: ICD-10-CM

## 2020-03-06 PROCEDURE — 97140 MANUAL THERAPY 1/> REGIONS: CPT | Performed by: PHYSICAL THERAPIST

## 2020-03-06 PROCEDURE — 97164 PT RE-EVAL EST PLAN CARE: CPT | Performed by: PHYSICAL THERAPIST

## 2020-03-06 PROCEDURE — 97110 THERAPEUTIC EXERCISES: CPT | Performed by: PHYSICAL THERAPIST

## 2020-03-06 NOTE — PROGRESS NOTES
Daily Note     Today's date: 3/6/2020  Patient name: Camila Costello  : 1965  MRN: [id number]  Referring provider: Elana Gamez MD  Dx:   Encounter Diagnosis     ICD-10-CM    1  Cervical radiculopathy M54 12    2  Chronic bilateral low back pain with bilateral sciatica M54 42     M54 41     G89 29                   Subjective: Pt notes that she is about 75% better in neck  Pt reports that repeated retraction helped decrease her symptoms  Objective: See treatment diary below  Mechanical assessment inconclusive as patient took pain medicine prior session  Lumbar Extension : mod loss    Lumbar Flexion : nil loss   Lateral flexion: min loss B/L   L rotation: min loss   R rotation: nil loss       Assessment:  Discussed with patient that now she is 75% in the neck that we will start to evaluate and work on her back  Pt was agreeable to evaluating her LBP sx  Symptoms were likely blunted because she takes pain medication on a daily basis and was not able to assess if there was any centralization or peripheralization of symptoms  Instructed patient to not take her pain medicines before NV in order to be able to assess her symptoms  She did have a moderate loss of lumbar extension and was painful, however she was then able to complete lumbar extension with no pain during lumbar extension SNAGs  Will further assess the L/S NV  Plan: Continue per plan of care  Progress treatment as tolerated         Precautions: depression       Manual  KB  2/7 2/10 2/14 2/18 2/21 2/25 2/28 3/2   Prone thoracic mobilizations             STM cspine paraspinals & UT   KB KK GM KB KB   KB   SO release    KB KK GM KB KB KB     pec minor STM              Med n glides         KB KB    Lumbar ext SNAG  kb            Lumbar assessment                  Exercise Diary  3/6  2/7 2/10 2/14 2/18 2/21 2/25 2/28 3/2   UBE backwards x10'     x5' x6' x8' x10' x10'   Thoracic ext over a chair   10s x10 10s x10 10sx10 10sx10 10sx10 10sx10 10sx10 10sx10   DNF    10s, 15s x2 15s x 10 15sx10 20sx5 20sx6 25sx6 30sx6 np   scap retraction c 6s holds   x20 x20 20x HEP       Low trap ER   10sx10 RTB 20x rtb 20x rtb 20x rtb 20x RTB 10s x20 RTB 10s x20 c ret np   TB rows    NV RTB 20x rtb 20x rtb 20x rtb 20x Gr x30 Gr x30 Gr x30   TB ext     rtb 20x rtb 20x rtb 20x rtb 30x Gr x30 Gr x30   Cervical retraction into Tband 4x15 without band      rtb 20x c 6 holds rtb 20x c 6 holds rtb 30x c 6 holds rtb 30x c 6 holds 4x15 without band    Postural re-ed with lumbar roll        x5 min       Doorway pec stretch        3x30s 3x30s 3x30s 3x30s   TB robbers        YTB 2x10 YTB 2x10 YTB 2x10   Med n glides          x15 np   Body mechanics for transfers         x10' np                                                                                                  Modalities    2/7   2/18 2/21 2/25 2/28 3/2

## 2020-03-09 ENCOUNTER — OFFICE VISIT (OUTPATIENT)
Dept: PHYSICAL THERAPY | Facility: CLINIC | Age: 55
End: 2020-03-09
Payer: COMMERCIAL

## 2020-03-09 DIAGNOSIS — M54.42 CHRONIC BILATERAL LOW BACK PAIN WITH BILATERAL SCIATICA: ICD-10-CM

## 2020-03-09 DIAGNOSIS — M54.12 CERVICAL RADICULOPATHY: Primary | ICD-10-CM

## 2020-03-09 DIAGNOSIS — G89.29 CHRONIC BILATERAL LOW BACK PAIN WITH BILATERAL SCIATICA: ICD-10-CM

## 2020-03-09 DIAGNOSIS — M54.41 CHRONIC BILATERAL LOW BACK PAIN WITH BILATERAL SCIATICA: ICD-10-CM

## 2020-03-09 PROCEDURE — 97110 THERAPEUTIC EXERCISES: CPT | Performed by: PHYSICAL THERAPIST

## 2020-03-09 PROCEDURE — 97112 NEUROMUSCULAR REEDUCATION: CPT | Performed by: PHYSICAL THERAPIST

## 2020-03-09 NOTE — PROGRESS NOTES
Daily Note     Today's date: 3/9/2020  Patient name: Tavia Barker  : 1965  MRN: [id number]  Referring provider: Rory Bruner MD  Dx:   Encounter Diagnosis     ICD-10-CM    1  Cervical radiculopathy M54 12    2  Chronic bilateral low back pain with bilateral sciatica M54 42     M54 41     G89 29                   Subjective: Pt notes that she is about 75% better in neck  Pt reports that repeated retraction helped decrease her symptoms  Objective: See treatment diary below  Poor multifidus activation   Hypomobile with joint accessory t/o the lumbar spine    Assessment:  Due to patients poor musculature recruitment with multifidus activation integrated multifidus and TA strengthening  She was able to perform with appropriate muscular soreness without reproduction of symptoms  She was able increase her deep neck flexor strengthening to within normal limits for testing  Plan: Continue per plan of care  Progress treatment as tolerated         Precautions: depression       Manual  KB 3/9  2/10 2/14 2/18 2/21 2/25 2/28 3/2   Prone thoracic mobilizations             STM cspine paraspinals & UT    KK GM KB KB   KB   SO release     KK GM KB KB KB     pec minor STM              Med n glides         KB KB    Lumbar ext SNAG  kb            Lumbar assessment                  Exercise Diary  3/6 3/9  2/10 2/14 2/18 2/21 2/25 2/28 3/2   UBE backwards x10'     x5' x6' x8' x10' x10'   Thoracic ext over a chair    10s x10 10sx10 10sx10 10sx10 10sx10 10sx10 10sx10   DNF   45sx3  15s x 10 15sx10 20sx5 20sx6 25sx6 30sx6 np   scap retraction c 6s holds    x20 20x HEP       Low trap ER  RTB 10s x20 c ret  RTB 20x rtb 20x rtb 20x rtb 20x RTB 10s x20 RTB 10s x20 c ret np   TB rows   Gr x30 c TA act  RTB 20x rtb 20x rtb 20x rtb 20x Gr x30 Gr x30 Gr x30   TB ext  Gr x30 c TA act   rtb 20x rtb 20x rtb 20x rtb 30x Gr x30 Gr x30   Cervical retraction into Tband 4x15 without band  rtb 30x c 6 holds    rtb 20x c 6 holds rtb 20x c 6 holds rtb 30x c 6 holds rtb 30x c 6 holds 4x15 without band    Postural re-ed with lumbar roll        x5 min       Doorway pec stretch        3x30s 3x30s 3x30s 3x30s   TB robbers  YTB 3x10      YTB 2x10 YTB 2x10 YTB 2x10   Med n glides          x15 np   Body mechanics for transfers         x10' np   SLR ext for mult act  x10ea           TA c biofeedback c 6s holds  x20           bridges  2x10           Anti rotations  x10 ea RTB double                                                      Modalities       2/18 2/21 2/25 2/28 3/2

## 2020-03-12 ENCOUNTER — TELEPHONE (OUTPATIENT)
Dept: NEUROLOGY | Facility: CLINIC | Age: 55
End: 2020-03-12

## 2020-03-13 ENCOUNTER — OFFICE VISIT (OUTPATIENT)
Dept: PHYSICAL THERAPY | Facility: CLINIC | Age: 55
End: 2020-03-13
Payer: COMMERCIAL

## 2020-03-13 DIAGNOSIS — M54.41 CHRONIC BILATERAL LOW BACK PAIN WITH BILATERAL SCIATICA: ICD-10-CM

## 2020-03-13 DIAGNOSIS — M54.42 CHRONIC BILATERAL LOW BACK PAIN WITH BILATERAL SCIATICA: ICD-10-CM

## 2020-03-13 DIAGNOSIS — M54.12 CERVICAL RADICULOPATHY: Primary | ICD-10-CM

## 2020-03-13 DIAGNOSIS — G89.29 CHRONIC BILATERAL LOW BACK PAIN WITH BILATERAL SCIATICA: ICD-10-CM

## 2020-03-13 PROCEDURE — 97112 NEUROMUSCULAR REEDUCATION: CPT | Performed by: PHYSICAL THERAPIST

## 2020-03-13 PROCEDURE — 97110 THERAPEUTIC EXERCISES: CPT | Performed by: PHYSICAL THERAPIST

## 2020-03-13 NOTE — PROGRESS NOTES
Daily Note     Today's date: 3/13/2020  Patient name: Monty Cui  : 1965  MRN: [id number]  Referring provider: James Garza MD  Dx:   Encounter Diagnosis     ICD-10-CM    1  Cervical radiculopathy M54 12    2  Chronic bilateral low back pain with bilateral sciatica M54 42     M54 41     G89 29                   Subjective: Pt states " I feel like I am getting better and better, I haven't taken the medication since "  Objective: See treatment diary below      Assessment:  Pt demonstrated improved ability to facilitate core musculature and did not require verbal cues to activate  She was fatigued with core and multifidus strengthening post tx session  Plan: Continue per plan of care  Progress treatment as tolerated         Precautions: depression       Manual  KB 3/9 3/13  2/14 2/18 2/21 2/25 2/28 3   Prone thoracic mobilizations             STM cspine paraspinals & UT     GM KB KB   KB   SO release      GM KB KB KB     pec minor STM              Med n glides         KB KB    Lumbar ext SNAG  kb            Lumbar assessment                  Exercise Diary  3/6 3/9 3/13  2/14 2/18 2/21 2/25 2/28 3   UBE backwards x10'  x10'   x5' x6' x8' x10' x10'   Thoracic ext over a chair     10sx10 10sx10 10sx10 10sx10 10sx10 10sx10   DNF   45sx3 np  15sx10 20sx5 20sx6 25sx6 30sx6 np   scap retraction c 6s holds     20x HEP       Low trap ER  RTB 10s x20 c ret GTB 10s x20 c ret  rtb 20x rtb 20x rtb 20x RTB 10s x20 RTB 10s x20 c ret np   TB rows   Gr x30 c TA act blue x30 c TA act  rtb 20x rtb 20x rtb 20x Gr x30 Gr x30 Gr x30   TB ext  Gr x30 c TA act Gr x30 c TA act  rtb 20x rtb 20x rtb 20x rtb 30x Gr x30 Gr x30   Cervical retraction into Tband 4x15 without band  rtb 30x c 6 holds    rtb 20x c 6 holds rtb 20x c 6 holds rtb 30x c 6 holds rtb 30x c 6 holds 4x15 without band    Postural re-ed with lumbar roll        x5 min       Doorway pec stretch        3x30s 3x30s 3x30s 3x30s   LAURA BALDERAS 3x10 YTB 3x10     YTB 2x10 YTB 2x10 YTB 2x10   Med n glides          x15 np   Body mechanics for transfers         x10' np   SLR ext for mult act  x10ea x10ea          TA c biofeedback c 6s holds  x20 x20          bridges  2x10 3x10          Anti rotations  x10 ea RTB double x20 ea RTB double                                                     Modalities       2/18 2/21 2/25 2/28 3/2

## 2020-03-16 ENCOUNTER — OFFICE VISIT (OUTPATIENT)
Dept: PHYSICAL THERAPY | Facility: CLINIC | Age: 55
End: 2020-03-16
Payer: COMMERCIAL

## 2020-03-16 DIAGNOSIS — G89.29 CHRONIC BILATERAL LOW BACK PAIN WITH BILATERAL SCIATICA: ICD-10-CM

## 2020-03-16 DIAGNOSIS — M54.12 CERVICAL RADICULOPATHY: Primary | ICD-10-CM

## 2020-03-16 DIAGNOSIS — M54.42 CHRONIC BILATERAL LOW BACK PAIN WITH BILATERAL SCIATICA: ICD-10-CM

## 2020-03-16 DIAGNOSIS — M54.41 CHRONIC BILATERAL LOW BACK PAIN WITH BILATERAL SCIATICA: ICD-10-CM

## 2020-03-16 PROCEDURE — 97110 THERAPEUTIC EXERCISES: CPT | Performed by: PHYSICAL THERAPIST

## 2020-03-16 PROCEDURE — 97112 NEUROMUSCULAR REEDUCATION: CPT | Performed by: PHYSICAL THERAPIST

## 2020-03-16 NOTE — PROGRESS NOTES
Daily Note     Today's date: 3/16/2020  Patient name: Paul Wilson  : 1965  MRN: [id number]  Referring provider: Ricardo Reyna MD  Dx:   Encounter Diagnosis     ICD-10-CM    1  Cervical radiculopathy M54 12    2  Chronic bilateral low back pain with bilateral sciatica M54 42     M54 41     G89 29                   Subjective: Pt notes that she has been feeling good and has been able to wake up without pain which has not been the case for a long time  Objective: See treatment diary below      Assessment:  Pt was challenged with quadriped exercises and demonstrated L hip drop with leg transition, integrated clams in order to address hip weakness  Plan: Continue per plan of care  Progress treatment as tolerated         Precautions: depression       Manual  KB 3/9 3/13 3/16  2/18 2/21 2/25 2/28 3   Prone thoracic mobilizations             STM cspine paraspinals & UT      KB KB   KB   SO release       KB KB KB     pec minor STM              Med n glides         KB KB    Lumbar ext SNAG  kb            Lumbar assessment                  Exercise Diary  3/6 3/9 3/13 3/16  2/18 2/21 2/25 2/28 32   UBE backwards x10'  x10' x10'  x5' x6' x8' x10' x10'   Thoracic ext over a chair      10sx10 10sx10 10sx10 10sx10 10sx10   DNF   45sx3 np   20sx5 20sx6 25sx6 30sx6 np   scap retraction c 6s holds      HEP       Low trap ER  RTB 10s x20 c ret GTB 10s x20 c ret GTB 10s x20 c ret  rtb 20x rtb 20x RTB 10s x20 RTB 10s x20 c ret np   TB rows   Gr x30 c TA act blue x30 c TA act blue x30 c TA act  rtb 20x rtb 20x Gr x30 Gr x30 Gr x30   TB ext  Gr x30 c TA act Gr x30 c TA act Gr x30 c TA act  rtb 20x rtb 20x rtb 30x Gr x30 Gr x30   Cervical retraction into Tband 4x15 without band  rtb 30x c 6 holds    rtb 20x c 6 holds rtb 20x c 6 holds rtb 30x c 6 holds rtb 30x c 6 holds 4x15 without band    Postural re-ed with lumbar roll        x5 min       Doorway pec stretch        3x30s 3x30s 3x30s 3x30s   LAURA BALDERAS 3x10 YTB 3x10 YTB 3x10    YTB 2x10 YTB 2x10 YTB 2x10   Med n glides          x15 np   Body mechanics for transfers         x10' np   SLR ext for mult act  x10ea x10ea 3x10 ea         TA c biofeedback c 6s holds  x20 x20 np         bridges  2x10 3x10 3x10         Anti rotations  x10 ea RTB double x20 ea RTB double x20 ea RTB double         Quad alt LE    x10 ea         Clams     3x10 ea                          Modalities       2/18 2/21 2/25 2/28 3/2

## 2020-03-19 ENCOUNTER — HOSPITAL ENCOUNTER (OUTPATIENT)
Dept: MRI IMAGING | Facility: HOSPITAL | Age: 55
Discharge: HOME | End: 2020-03-19
Attending: PSYCHIATRY & NEUROLOGY
Payer: COMMERCIAL

## 2020-03-19 DIAGNOSIS — M54.41 CHRONIC BILATERAL LOW BACK PAIN WITH BILATERAL SCIATICA: ICD-10-CM

## 2020-03-19 DIAGNOSIS — M54.42 CHRONIC BILATERAL LOW BACK PAIN WITH BILATERAL SCIATICA: ICD-10-CM

## 2020-03-19 DIAGNOSIS — G89.29 CHRONIC BILATERAL LOW BACK PAIN WITH BILATERAL SCIATICA: ICD-10-CM

## 2020-03-19 PROCEDURE — 72148 MRI LUMBAR SPINE W/O DYE: CPT

## 2020-03-20 ENCOUNTER — OFFICE VISIT (OUTPATIENT)
Dept: PHYSICAL THERAPY | Facility: CLINIC | Age: 55
End: 2020-03-20
Payer: COMMERCIAL

## 2020-03-20 DIAGNOSIS — M54.41 CHRONIC BILATERAL LOW BACK PAIN WITH BILATERAL SCIATICA: ICD-10-CM

## 2020-03-20 DIAGNOSIS — M54.42 CHRONIC BILATERAL LOW BACK PAIN WITH BILATERAL SCIATICA: ICD-10-CM

## 2020-03-20 DIAGNOSIS — G89.29 CHRONIC BILATERAL LOW BACK PAIN WITH BILATERAL SCIATICA: ICD-10-CM

## 2020-03-20 DIAGNOSIS — M54.12 CERVICAL RADICULOPATHY: Primary | ICD-10-CM

## 2020-03-20 PROCEDURE — 97110 THERAPEUTIC EXERCISES: CPT | Performed by: PHYSICAL THERAPIST

## 2020-03-20 PROCEDURE — 97112 NEUROMUSCULAR REEDUCATION: CPT | Performed by: PHYSICAL THERAPIST

## 2020-03-20 NOTE — PROGRESS NOTES
Daily Note     Today's date: 3/20/2020  Patient name: Robyn Laws  : 1965  MRN: [id number]  Referring provider: Cindy Ferreira MD  Dx:   Encounter Diagnosis     ICD-10-CM    1  Cervical radiculopathy M54 12    2  Chronic bilateral low back pain with bilateral sciatica M54 42     M54 41     G89 29                   Subjective: Pt notes that she had to do a lot of lifting and transferring with her patient yesterday so her back is sore today but notes that it is not as bad as she used to be  Objective: See treatment diary below      Assessment: Pt had difficulty with recall of program today  Required extensive maximal verbal and tactile cues in order to perform  Required most tactile cues with quad alt LE  She was challenged with progressions but did not have reproduction of symptoms  Plan: Continue per plan of care  Potential discharge next visit  Progress treatment as tolerated         Precautions: depression       Manual  KB 3/9 3/13 3/16 3/20  2/21 2/25 2/28 3/2   Prone thoracic mobilizations             STM cspine paraspinals & UT       KB   KB   SO release        KB KB     pec minor STM              Med n glides         KB KB    Lumbar ext SNAG  kb            Lumbar assessment                  Exercise Diary  3/6 3/9 3/13 3/16 3/20  2/21 2/25 2/28 3/2   UBE backwards x10'  x10' x10' x10'  x6' x8' x10' x10'   Thoracic ext over a chair       10sx10 10sx10 10sx10 10sx10   DNF   45sx3 np    20sx6 25sx6 30sx6 np   scap retraction c 6s holds             Low trap ER  RTB 10s x20 c ret GTB 10s x20 c ret GTB 10s x20 c ret Blue 10s x20 c ret   rtb 20x RTB 10s x20 RTB 10s x20 c ret np   TB rows   Gr x30 c TA act blue x30 c TA act blue x30 c TA act blue x30 c TA act  rtb 20x Gr x30 Gr x30 Gr x30   TB ext  Gr x30 c TA act Gr x30 c TA act Gr x30 c TA act Gr x30 c TA act  rtb 20x rtb 30x Gr x30 Gr x30   Cervical retraction into Tband 4x15 without band  rtb 30x c 6 holds     rtb 20x c 6 holds rtb 30x c 6 holds rtb 30x c 6 holds 4x15 without band    Postural re-ed with lumbar roll        x5 min       Doorway pec stretch        3x30s 3x30s 3x30s 3x30s   TB robbers  YTB 3x10 YTB 3x10 YTB 3x10 YTB 3x10   YTB 2x10 YTB 2x10 YTB 2x10   Med n glides          x15 np   Body mechanics for transfers         x10' np   SLR ext for mult act  x10ea x10ea 3x10 ea 3x10 ea        TA c biofeedback c 6s holds  x20 x20 np         bridges  2x10 3x10 3x10 3x10 c abd        Anti rotations  x10 ea RTB double x20 ea RTB double x20 ea RTB double x30 ea RTB double        Quad alt LE    x10 ea x10 ea        Clams     3x10 ea 3x10 ea                         Modalities       2/18 2/21 2/25 2/28 3/2

## 2020-03-23 ENCOUNTER — APPOINTMENT (OUTPATIENT)
Dept: PHYSICAL THERAPY | Facility: CLINIC | Age: 55
End: 2020-03-23
Payer: COMMERCIAL

## 2020-03-27 ENCOUNTER — APPOINTMENT (OUTPATIENT)
Dept: PHYSICAL THERAPY | Facility: CLINIC | Age: 55
End: 2020-03-27
Payer: COMMERCIAL

## 2020-03-30 ENCOUNTER — APPOINTMENT (OUTPATIENT)
Dept: PHYSICAL THERAPY | Facility: CLINIC | Age: 55
End: 2020-03-30
Payer: COMMERCIAL

## 2020-03-30 NOTE — PROGRESS NOTES
PT Discharge    Today's date: 3/30/2020  Patient name: Vu Tilley  : 1965  MRN: [id number]  Referring provider: Dennis Guzman MD  Dx:   Encounter Diagnosis     ICD-10-CM    1  Cervical radiculopathy M54 12    2  Chronic bilateral low back pain with bilateral sciatica M54 42     M54 41     G89 29        Start Time: 0810  Stop Time: 0858  Total time in clinic (min): 48 minutes    Assessment/Plan  Patient has met their goals for PT, improved FOTO scores, and has a verbal report of improvement  she will be d/c to a home program  Pt was instructed that if any questions come up that they can contact our office             Flowsheet Rows      Most Recent Value   PT/OT G-Codes   Current Score  96   Projected Score  62

## 2020-06-13 DIAGNOSIS — G44.89 OTHER HEADACHE SYNDROME: ICD-10-CM

## 2020-06-13 DIAGNOSIS — M54.12 CERVICAL RADICULOPATHY: ICD-10-CM

## 2020-06-15 RX ORDER — GABAPENTIN 100 MG/1
CAPSULE ORAL
Qty: 21 CAPSULE | Refills: 7 | Status: SHIPPED | OUTPATIENT
Start: 2020-06-15 | End: 2020-08-10 | Stop reason: DRUGHIGH

## 2020-07-21 ENCOUNTER — TELEPHONE (OUTPATIENT)
Dept: NEUROLOGY | Facility: CLINIC | Age: 55
End: 2020-07-21

## 2020-08-04 ENCOUNTER — TELEPHONE (OUTPATIENT)
Dept: NEUROLOGY | Facility: CLINIC | Age: 55
End: 2020-08-04

## 2020-08-07 ENCOUNTER — TELEPHONE (OUTPATIENT)
Dept: NEUROLOGY | Facility: CLINIC | Age: 55
End: 2020-08-07

## 2020-08-07 NOTE — TELEPHONE ENCOUNTER
Attempted to call [phone] and [facility] letting Sudeepsue Tonia know we see she has an appointment on Monday with Dr Lashanda Arellano at 8:45 am, we are calling to see if she had gotten her Blood work done, if yes where? Let her know if she can give us a call back when she has a chance we would appreciate it

## 2020-08-07 NOTE — TELEPHONE ENCOUNTER
Zari returned call  Forgot about labs  Asking that we fax to [facility] in [place]  Also asking we make appt for her  Called [facility] at [phone]  Patient scheduled for 8/7/2020 at 1:45  Labs faxed to [phone]  Notified patient

## 2020-08-10 ENCOUNTER — OFFICE VISIT (OUTPATIENT)
Dept: NEUROLOGY | Facility: CLINIC | Age: 55
End: 2020-08-10
Payer: COMMERCIAL

## 2020-08-10 VITALS
WEIGHT: 246 LBS | BODY MASS INDEX: 35.22 KG/M2 | DIASTOLIC BLOOD PRESSURE: 78 MMHG | RESPIRATION RATE: 14 BRPM | OXYGEN SATURATION: 96 % | HEIGHT: 70 IN | SYSTOLIC BLOOD PRESSURE: 124 MMHG | TEMPERATURE: 98.3 F | HEART RATE: 70 BPM

## 2020-08-10 DIAGNOSIS — G89.29 CHRONIC BILATERAL LOW BACK PAIN WITH BILATERAL SCIATICA: Primary | ICD-10-CM

## 2020-08-10 DIAGNOSIS — M54.12 CERVICAL RADICULOPATHY: ICD-10-CM

## 2020-08-10 DIAGNOSIS — M54.42 CHRONIC BILATERAL LOW BACK PAIN WITH BILATERAL SCIATICA: Primary | ICD-10-CM

## 2020-08-10 DIAGNOSIS — M54.41 CHRONIC BILATERAL LOW BACK PAIN WITH BILATERAL SCIATICA: Primary | ICD-10-CM

## 2020-08-10 DIAGNOSIS — G44.89 OTHER HEADACHE SYNDROME: ICD-10-CM

## 2020-08-10 PROCEDURE — 99214 OFFICE O/P EST MOD 30 MIN: CPT | Performed by: PSYCHIATRY & NEUROLOGY

## 2020-08-10 RX ORDER — GABAPENTIN 100 MG/1
CAPSULE ORAL
Qty: 60 CAPSULE | Refills: 4 | Status: SHIPPED | OUTPATIENT
Start: 2020-08-10 | End: 2020-11-11 | Stop reason: SDUPTHER
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
## 2020-08-12 LAB
ALBUMIN SERPL-MCNC: 4.3 G/DL (ref 3.6–5.1)
ALBUMIN/GLOB SERPL: 1.7 (CALC) (ref 1–2.5)
ALP SERPL-CCNC: 77 U/L (ref 37–153)
ALT SERPL-CCNC: 17 U/L (ref 6–29)
AST SERPL-CCNC: 15 U/L (ref 10–35)
B BURGDOR AB SER IA-ACNC: <0.9 INDEX
BASOPHILS # BLD AUTO: 31 CELLS/UL (ref 0–200)
BASOPHILS NFR BLD AUTO: 0.6 %
BILIRUB SERPL-MCNC: 0.5 MG/DL (ref 0.2–1.2)
BUN SERPL-MCNC: 19 MG/DL (ref 7–25)
BUN/CREAT SERPL: NORMAL (CALC) (ref 6–22)
CALCIUM SERPL-MCNC: 9.2 MG/DL (ref 8.6–10.4)
CHLORIDE SERPL-SCNC: 107 MMOL/L (ref 98–110)
CO2 SERPL-SCNC: 28 MMOL/L (ref 20–32)
CREAT SERPL-MCNC: 0.91 MG/DL (ref 0.5–1.05)
CRP SERPL-MCNC: 6.5 MG/L
EOSINOPHIL # BLD AUTO: 71 CELLS/UL (ref 15–500)
EOSINOPHIL NFR BLD AUTO: 1.4 %
ERYTHROCYTE [DISTWIDTH] IN BLOOD BY AUTOMATED COUNT: 12.4 % (ref 11–15)
ERYTHROCYTE [SEDIMENTATION RATE] IN BLOOD BY WESTERGREN METHOD: 2 MM/H
GLOBULIN SER CALC-MCNC: 2.5 G/DL (CALC) (ref 1.9–3.7)
GLUCOSE SERPL-MCNC: 97 MG/DL (ref 65–99)
HCT VFR BLD AUTO: 40.6 % (ref 35–45)
HGB BLD-MCNC: 13 G/DL (ref 11.7–15.5)
LYMPHOCYTES # BLD AUTO: 1969 CELLS/UL (ref 850–3900)
LYMPHOCYTES NFR BLD AUTO: 38.6 %
MCH RBC QN AUTO: 30.4 PG (ref 27–33)
MCHC RBC AUTO-ENTMCNC: 32 G/DL (ref 32–36)
MCV RBC AUTO: 95.1 FL (ref 80–100)
MONOCYTES # BLD AUTO: 281 CELLS/UL (ref 200–950)
MONOCYTES NFR BLD AUTO: 5.5 %
NEUTROPHILS # BLD AUTO: 2749 CELLS/UL (ref 1500–7800)
NEUTROPHILS NFR BLD AUTO: 53.9 %
PLATELET # BLD AUTO: 262 THOUSAND/UL (ref 140–400)
PMV BLD REES-ECKER: 9.8 FL (ref 7.5–12.5)
POTASSIUM SERPL-SCNC: 4.4 MMOL/L (ref 3.5–5.3)
PROT SERPL-MCNC: 6.8 G/DL (ref 6.1–8.1)
RBC # BLD AUTO: 4.27 MILLION/UL (ref 3.8–5.1)
SL AMB EGFR AFRICAN AMERICAN: 82 ML/MIN/1.73M2
SL AMB EGFR NON AFRICAN AMERICAN: 71 ML/MIN/1.73M2
SODIUM SERPL-SCNC: 142 MMOL/L (ref 135–146)
TSH SERPL-ACNC: 3.43 MIU/L
WBC # BLD AUTO: 5.1 THOUSAND/UL (ref 3.8–10.8)

## 2020-09-14 ENCOUNTER — EVALUATION (OUTPATIENT)
Dept: PHYSICAL THERAPY | Facility: CLINIC | Age: 55
End: 2020-09-14
Payer: COMMERCIAL

## 2020-09-14 DIAGNOSIS — M54.42 CHRONIC BILATERAL LOW BACK PAIN WITH BILATERAL SCIATICA: ICD-10-CM

## 2020-09-14 DIAGNOSIS — G89.29 CHRONIC BILATERAL LOW BACK PAIN WITH BILATERAL SCIATICA: ICD-10-CM

## 2020-09-14 DIAGNOSIS — M54.41 CHRONIC BILATERAL LOW BACK PAIN WITH BILATERAL SCIATICA: ICD-10-CM

## 2020-09-14 DIAGNOSIS — M54.12 CERVICAL RADICULOPATHY: ICD-10-CM

## 2020-09-14 DIAGNOSIS — G44.89 OTHER HEADACHE SYNDROME: ICD-10-CM

## 2020-09-14 PROCEDURE — 97110 THERAPEUTIC EXERCISES: CPT | Performed by: PHYSICAL THERAPIST

## 2020-09-14 PROCEDURE — 97140 MANUAL THERAPY 1/> REGIONS: CPT | Performed by: PHYSICAL THERAPIST
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
## 2020-09-16 ENCOUNTER — OFFICE VISIT (OUTPATIENT)
Dept: PHYSICAL THERAPY | Facility: CLINIC | Age: 55
End: 2020-09-16
Payer: COMMERCIAL

## 2020-09-16 ENCOUNTER — APPOINTMENT (OUTPATIENT)
Dept: PHYSICAL THERAPY | Facility: CLINIC | Age: 55
End: 2020-09-16
Payer: COMMERCIAL

## 2020-09-16 DIAGNOSIS — G89.29 CHRONIC BILATERAL LOW BACK PAIN WITH BILATERAL SCIATICA: Primary | ICD-10-CM

## 2020-09-16 DIAGNOSIS — M54.41 CHRONIC BILATERAL LOW BACK PAIN WITH BILATERAL SCIATICA: Primary | ICD-10-CM

## 2020-09-16 DIAGNOSIS — G44.89 OTHER HEADACHE SYNDROME: ICD-10-CM

## 2020-09-16 DIAGNOSIS — M54.12 CERVICAL RADICULOPATHY: ICD-10-CM

## 2020-09-16 DIAGNOSIS — M54.42 CHRONIC BILATERAL LOW BACK PAIN WITH BILATERAL SCIATICA: Primary | ICD-10-CM

## 2020-09-16 PROCEDURE — 97112 NEUROMUSCULAR REEDUCATION: CPT | Performed by: PHYSICAL THERAPIST

## 2020-09-16 PROCEDURE — 97140 MANUAL THERAPY 1/> REGIONS: CPT | Performed by: PHYSICAL THERAPIST

## 2020-09-16 PROCEDURE — 97110 THERAPEUTIC EXERCISES: CPT | Performed by: PHYSICAL THERAPIST

## 2020-09-16 NOTE — PROGRESS NOTES
Daily Note     Today's date: 2020  Patient name: Roshan Carbajal  : 1965  MRN: [id number]  Referring provider: Steffi Hashimoto, MD  Dx:   Encounter Diagnosis     ICD-10-CM    1  Chronic bilateral low back pain with bilateral sciatica  M54 42     M54 41     G89 29    2  Cervical radiculopathy  M54 12    3  Other headache syndrome  G44 89                   Subjective: Patient states she felt good the day she left therapy  She was sore the next day  States she's been working very hard at work  Objective: See treatment diary below      Assessment: Tolerated treatment well  Patient self reports less tightness from start to finish of session  She is able to progress exercise program, but does fatigue overall post session  She will continue to benefit from strengthening  Patient would benefit from continued PT      Plan: Continue per plan of care        Precautions: chronic history of lumbar/cervical pain; previous lumbar surgery        Manuals            Prone TSP/upper lumbar  8' 8'                                                  Neuro Re-Ed             TA engagement w BP cuff w 3 normal breaths  x10           TA w BP cuff w marching   2x10 ea           Adductor set  3"x20           Prone hip extension (glute first)  2x10 ea           PGM Clamshell  NV                                     Ther Ex             Pt Edu KS            Recumbent bike  5'           RetroUBE  5'           Open books  5"x10 ea           CHENTE  2x10                                                  Ther Activity                                       Gait Training                                       Modalities

## 2020-09-23 ENCOUNTER — OFFICE VISIT (OUTPATIENT)
Dept: PHYSICAL THERAPY | Facility: CLINIC | Age: 55
End: 2020-09-23
Payer: COMMERCIAL

## 2020-09-23 DIAGNOSIS — G89.29 CHRONIC BILATERAL LOW BACK PAIN WITH BILATERAL SCIATICA: Primary | ICD-10-CM

## 2020-09-23 DIAGNOSIS — M54.12 CERVICAL RADICULOPATHY: ICD-10-CM

## 2020-09-23 DIAGNOSIS — M54.42 CHRONIC BILATERAL LOW BACK PAIN WITH BILATERAL SCIATICA: Primary | ICD-10-CM

## 2020-09-23 DIAGNOSIS — G44.89 OTHER HEADACHE SYNDROME: ICD-10-CM

## 2020-09-23 DIAGNOSIS — M54.41 CHRONIC BILATERAL LOW BACK PAIN WITH BILATERAL SCIATICA: Primary | ICD-10-CM

## 2020-09-23 PROCEDURE — 97110 THERAPEUTIC EXERCISES: CPT | Performed by: PHYSICAL THERAPIST

## 2020-09-23 PROCEDURE — 97112 NEUROMUSCULAR REEDUCATION: CPT | Performed by: PHYSICAL THERAPIST

## 2020-09-23 PROCEDURE — 97140 MANUAL THERAPY 1/> REGIONS: CPT | Performed by: PHYSICAL THERAPIST

## 2020-09-23 NOTE — PROGRESS NOTES
Daily Note     Today's date: 2020  Patient name: Rabia Jones  : 1965  MRN: [id number]  Referring provider: Chuyita Villa MD  Dx:   Encounter Diagnosis     ICD-10-CM    1  Chronic bilateral low back pain with bilateral sciatica  M54 42     M54 41     G89 29    2  Cervical radiculopathy  M54 12    3  Other headache syndrome  G44 89                   Subjective: Patient again reports feeling good after last session  Pain = 5/10 at start of session  She states she does feel better overall, but states she hasn't been doing her home exercises  "I've been too busy "       Objective: See treatment diary below      Assessment: Tolerated treatment well  Significant reports of reduced pain from start to finish of session  2/10 pain post session  She continues to be encouraged to continue with home exercises in order to maximize her outcome  She will continue to benefit from strengthening  Patient would benefit from continued PT      Plan: Continue per plan of care        Precautions: chronic history of lumbar/cervical pain; previous lumbar surgery        Manuals           Prone TSP/upper lumbar  8' 8' 8'                                                 Neuro Re-Ed             TA engagement w BP cuff w 3 normal breaths  x10 x10           TA w BP cuff w marching   2x10 ea w BKFO x 10 ea          Adductor set  3"x20 HEP today          Prone hip extension (glute first)  2x10 ea HEP today          PGM Clamshell  NV 10"x10                                     Ther Ex             Pt Edu [place]            Recumbent bike  5' 5'          RetroUBE  5' 5'          Open books  5"x10 ea HEP today          CHENTE  2x10 HOLD                                                  Ther Activity                                       Gait Training                                       Modalities

## 2020-09-25 ENCOUNTER — OFFICE VISIT (OUTPATIENT)
Dept: PHYSICAL THERAPY | Facility: CLINIC | Age: 55
End: 2020-09-25
Payer: COMMERCIAL

## 2020-09-25 DIAGNOSIS — M54.42 CHRONIC BILATERAL LOW BACK PAIN WITH BILATERAL SCIATICA: Primary | ICD-10-CM

## 2020-09-25 DIAGNOSIS — M54.41 CHRONIC BILATERAL LOW BACK PAIN WITH BILATERAL SCIATICA: Primary | ICD-10-CM

## 2020-09-25 DIAGNOSIS — M54.12 CERVICAL RADICULOPATHY: ICD-10-CM

## 2020-09-25 DIAGNOSIS — G44.89 OTHER HEADACHE SYNDROME: ICD-10-CM

## 2020-09-25 DIAGNOSIS — G89.29 CHRONIC BILATERAL LOW BACK PAIN WITH BILATERAL SCIATICA: Primary | ICD-10-CM

## 2020-09-25 PROCEDURE — 97110 THERAPEUTIC EXERCISES: CPT

## 2020-09-25 PROCEDURE — 97112 NEUROMUSCULAR REEDUCATION: CPT

## 2020-09-25 NOTE — PROGRESS NOTES
Daily Note     Today's date: 2020  Patient name: Radha Schmidt  : 1965  MRN: [id number]  Referring provider: Marcelino Chang MD  Dx:   Encounter Diagnosis     ICD-10-CM    1  Chronic bilateral low back pain with bilateral sciatica  M54 42     M54 41     G89 29    2  Cervical radiculopathy  M54 12    3  Other headache syndrome  G44 89                   Subjective: Pt states she has improved overall since last session  Primary complaint is low back which is "3/10"  Following session, pt states she feels "fantastic, loose and mobile "      Objective: See treatment diary below      Assessment: As per EN, PT evaluation of spinal mobility during mobilizations, pt states thoracic spine mobility is limited and suggests addition of thoracic extension exercises  Patient has difficulty maintaining TA activation with LE movement  Plan: Continue per plan of care        Precautions: chronic history of lumbar/cervical pain; previous lumbar surgery        Manuals          Prone TSP/upper lumbar  8' 8' 8' EN, PT                                                Neuro Re-Ed             TA engagement w BP cuff w 3 normal breaths  x10 x10  x20         TA w BP cuff w marching   2x10 ea w BKFO x 10 ea w BKFO x10 ea         Adductor set  3"x20 HEP today          Prone hip extension (glute first)  2x10 ea HEP today          PGM Clamshell  NV 10"x10  10"x10                                   Ther Ex             Pt Edu KS            Recumbent bike  5' 5' 5'         RetroUBE  5' 5' 5'         Open books  5"x10 ea HEP today          CHENTE  2x10 HOLD           Cat/camel    10"x10         Seated thoracic ext    10"x10         Kneeling warrior pose    10"x10         Ther Activity                                       Gait Training                                       Modalities

## 2020-09-28 ENCOUNTER — OFFICE VISIT (OUTPATIENT)
Dept: PHYSICAL THERAPY | Facility: CLINIC | Age: 55
End: 2020-09-28
Payer: COMMERCIAL

## 2020-09-28 DIAGNOSIS — M54.12 CERVICAL RADICULOPATHY: ICD-10-CM

## 2020-09-28 DIAGNOSIS — G89.29 CHRONIC BILATERAL LOW BACK PAIN WITH BILATERAL SCIATICA: Primary | ICD-10-CM

## 2020-09-28 DIAGNOSIS — G44.89 OTHER HEADACHE SYNDROME: ICD-10-CM

## 2020-09-28 DIAGNOSIS — M54.41 CHRONIC BILATERAL LOW BACK PAIN WITH BILATERAL SCIATICA: Primary | ICD-10-CM

## 2020-09-28 DIAGNOSIS — M54.42 CHRONIC BILATERAL LOW BACK PAIN WITH BILATERAL SCIATICA: Primary | ICD-10-CM

## 2020-09-28 PROCEDURE — 97112 NEUROMUSCULAR REEDUCATION: CPT | Performed by: PHYSICAL THERAPIST

## 2020-09-28 PROCEDURE — 97140 MANUAL THERAPY 1/> REGIONS: CPT | Performed by: PHYSICAL THERAPIST

## 2020-09-28 PROCEDURE — 97110 THERAPEUTIC EXERCISES: CPT | Performed by: PHYSICAL THERAPIST

## 2020-09-28 NOTE — PROGRESS NOTES
Daily Note     Today's date: 2020  Patient name: Curtis Yanez  : 1965  MRN: [id number]  Referring provider: Loraine Prader, MD  Dx:   Encounter Diagnosis     ICD-10-CM    1  Chronic bilateral low back pain with bilateral sciatica  M54 42     M54 41     G89 29    2  Cervical radiculopathy  M54 12    3  Other headache syndrome  G44 89                   Subjective: Pt states she is trying to do her home exercises more, which has been helping her pain, she states  She reports a 25% global rating of improvement  Objective: See treatment diary below      Assessment:  Patient able to continue with thoracic exercises with appropriate response  Patient has no issues during or post session  She continues to note good response overall to exercises  Plan: Continue per plan of care        Precautions: chronic history of lumbar/cervical pain; previous lumbar surgery        Manuals         Prone TSP/upper lumbar  8' 8' 8' EN, PT 10'                                               Neuro Re-Ed             TA engagement w BP cuff w 3 normal breaths  x10 x10  x20 x20        TA w BP cuff w marching   2x10 ea w BKFO x 10 ea w BKFO x10 ea w BKFO x 10 ea        Adductor set  3"x20 HEP today          Prone hip extension (glute first)  2x10 ea HEP today  2x10 ea        PGM Clamshell  NV 10"x10  10"x10 10"x10 ea                                  Ther Ex             Pt Edu KS            Recumbent bike  5' 5' 5' 5'        RetroUBE  5' 5' 5' 5'        Open books  5"x10 ea HEP today  standing 5"x10 ea        CHENTE  2x10 HOLD           Cat/camel    10"x10         Seated thoracic ext    10"x10 10"X10        Kneeling warrior pose    10"x10         Ther Activity                                       Gait Training                                       Modalities

## 2020-10-02 ENCOUNTER — OFFICE VISIT (OUTPATIENT)
Dept: PHYSICAL THERAPY | Facility: CLINIC | Age: 55
End: 2020-10-02
Payer: COMMERCIAL

## 2020-10-02 DIAGNOSIS — G89.29 CHRONIC BILATERAL LOW BACK PAIN WITH BILATERAL SCIATICA: Primary | ICD-10-CM

## 2020-10-02 DIAGNOSIS — G44.89 OTHER HEADACHE SYNDROME: ICD-10-CM

## 2020-10-02 DIAGNOSIS — M54.12 CERVICAL RADICULOPATHY: ICD-10-CM

## 2020-10-02 DIAGNOSIS — M54.41 CHRONIC BILATERAL LOW BACK PAIN WITH BILATERAL SCIATICA: Primary | ICD-10-CM

## 2020-10-02 DIAGNOSIS — M54.42 CHRONIC BILATERAL LOW BACK PAIN WITH BILATERAL SCIATICA: Primary | ICD-10-CM

## 2020-10-02 PROCEDURE — 97110 THERAPEUTIC EXERCISES: CPT | Performed by: PHYSICAL THERAPIST

## 2020-10-02 PROCEDURE — 97112 NEUROMUSCULAR REEDUCATION: CPT | Performed by: PHYSICAL THERAPIST

## 2020-10-02 PROCEDURE — 97140 MANUAL THERAPY 1/> REGIONS: CPT | Performed by: PHYSICAL THERAPIST

## 2020-10-05 ENCOUNTER — APPOINTMENT (OUTPATIENT)
Dept: PHYSICAL THERAPY | Facility: CLINIC | Age: 55
End: 2020-10-05
Payer: COMMERCIAL

## 2020-10-07 ENCOUNTER — OFFICE VISIT (OUTPATIENT)
Dept: PHYSICAL THERAPY | Facility: CLINIC | Age: 55
End: 2020-10-07
Payer: COMMERCIAL

## 2020-10-07 DIAGNOSIS — G44.89 OTHER HEADACHE SYNDROME: ICD-10-CM

## 2020-10-07 DIAGNOSIS — M54.42 CHRONIC BILATERAL LOW BACK PAIN WITH BILATERAL SCIATICA: Primary | ICD-10-CM

## 2020-10-07 DIAGNOSIS — M54.41 CHRONIC BILATERAL LOW BACK PAIN WITH BILATERAL SCIATICA: Primary | ICD-10-CM

## 2020-10-07 DIAGNOSIS — G89.29 CHRONIC BILATERAL LOW BACK PAIN WITH BILATERAL SCIATICA: Primary | ICD-10-CM

## 2020-10-07 DIAGNOSIS — M54.12 CERVICAL RADICULOPATHY: ICD-10-CM

## 2020-10-07 PROCEDURE — 97140 MANUAL THERAPY 1/> REGIONS: CPT | Performed by: PHYSICAL THERAPIST

## 2020-10-07 PROCEDURE — 97112 NEUROMUSCULAR REEDUCATION: CPT | Performed by: PHYSICAL THERAPIST

## 2020-10-14 ENCOUNTER — APPOINTMENT (OUTPATIENT)
Dept: PHYSICAL THERAPY | Facility: CLINIC | Age: 55
End: 2020-10-14
Payer: COMMERCIAL

## 2020-10-15 ENCOUNTER — APPOINTMENT (OUTPATIENT)
Dept: PHYSICAL THERAPY | Facility: CLINIC | Age: 55
End: 2020-10-15
Payer: COMMERCIAL

## 2020-10-20 ENCOUNTER — OFFICE VISIT (OUTPATIENT)
Dept: PHYSICAL THERAPY | Facility: CLINIC | Age: 55
End: 2020-10-20
Payer: COMMERCIAL

## 2020-10-20 DIAGNOSIS — M54.41 CHRONIC BILATERAL LOW BACK PAIN WITH BILATERAL SCIATICA: Primary | ICD-10-CM

## 2020-10-20 DIAGNOSIS — G44.89 OTHER HEADACHE SYNDROME: ICD-10-CM

## 2020-10-20 DIAGNOSIS — G89.29 CHRONIC BILATERAL LOW BACK PAIN WITH BILATERAL SCIATICA: Primary | ICD-10-CM

## 2020-10-20 DIAGNOSIS — M54.12 CERVICAL RADICULOPATHY: ICD-10-CM

## 2020-10-20 DIAGNOSIS — M54.42 CHRONIC BILATERAL LOW BACK PAIN WITH BILATERAL SCIATICA: Primary | ICD-10-CM

## 2020-10-20 PROCEDURE — 97140 MANUAL THERAPY 1/> REGIONS: CPT | Performed by: PHYSICAL THERAPIST

## 2020-10-20 PROCEDURE — 97110 THERAPEUTIC EXERCISES: CPT | Performed by: PHYSICAL THERAPIST

## 2020-10-20 PROCEDURE — 97112 NEUROMUSCULAR REEDUCATION: CPT | Performed by: PHYSICAL THERAPIST

## 2020-10-23 ENCOUNTER — EVALUATION (OUTPATIENT)
Dept: PHYSICAL THERAPY | Facility: CLINIC | Age: 55
End: 2020-10-23
Payer: COMMERCIAL

## 2020-10-23 DIAGNOSIS — M54.12 CERVICAL RADICULOPATHY: ICD-10-CM

## 2020-10-23 DIAGNOSIS — G44.89 OTHER HEADACHE SYNDROME: ICD-10-CM

## 2020-10-23 DIAGNOSIS — M54.41 CHRONIC BILATERAL LOW BACK PAIN WITH BILATERAL SCIATICA: Primary | ICD-10-CM

## 2020-10-23 DIAGNOSIS — G89.29 CHRONIC BILATERAL LOW BACK PAIN WITH BILATERAL SCIATICA: Primary | ICD-10-CM

## 2020-10-23 DIAGNOSIS — M54.42 CHRONIC BILATERAL LOW BACK PAIN WITH BILATERAL SCIATICA: Primary | ICD-10-CM

## 2020-10-23 PROCEDURE — 97110 THERAPEUTIC EXERCISES: CPT | Performed by: PHYSICAL THERAPIST

## 2020-10-23 PROCEDURE — 97140 MANUAL THERAPY 1/> REGIONS: CPT | Performed by: PHYSICAL THERAPIST

## 2020-10-28 ENCOUNTER — OFFICE VISIT (OUTPATIENT)
Dept: PHYSICAL THERAPY | Facility: CLINIC | Age: 55
End: 2020-10-28
Payer: COMMERCIAL

## 2020-10-28 DIAGNOSIS — M54.41 CHRONIC BILATERAL LOW BACK PAIN WITH BILATERAL SCIATICA: Primary | ICD-10-CM

## 2020-10-28 DIAGNOSIS — G89.29 CHRONIC BILATERAL LOW BACK PAIN WITH BILATERAL SCIATICA: Primary | ICD-10-CM

## 2020-10-28 DIAGNOSIS — M54.12 CERVICAL RADICULOPATHY: ICD-10-CM

## 2020-10-28 DIAGNOSIS — M54.42 CHRONIC BILATERAL LOW BACK PAIN WITH BILATERAL SCIATICA: Primary | ICD-10-CM

## 2020-10-28 DIAGNOSIS — G44.89 OTHER HEADACHE SYNDROME: ICD-10-CM

## 2020-10-28 PROCEDURE — 97110 THERAPEUTIC EXERCISES: CPT | Performed by: PHYSICAL THERAPIST

## 2020-10-28 PROCEDURE — 97140 MANUAL THERAPY 1/> REGIONS: CPT | Performed by: PHYSICAL THERAPIST

## 2020-10-29 ENCOUNTER — OFFICE VISIT (OUTPATIENT)
Dept: PHYSICAL THERAPY | Facility: CLINIC | Age: 55
End: 2020-10-29
Payer: COMMERCIAL

## 2020-10-29 DIAGNOSIS — G44.89 OTHER HEADACHE SYNDROME: ICD-10-CM

## 2020-10-29 DIAGNOSIS — M54.42 CHRONIC BILATERAL LOW BACK PAIN WITH BILATERAL SCIATICA: Primary | ICD-10-CM

## 2020-10-29 DIAGNOSIS — G89.29 CHRONIC BILATERAL LOW BACK PAIN WITH BILATERAL SCIATICA: Primary | ICD-10-CM

## 2020-10-29 DIAGNOSIS — M54.41 CHRONIC BILATERAL LOW BACK PAIN WITH BILATERAL SCIATICA: Primary | ICD-10-CM

## 2020-10-29 DIAGNOSIS — M54.12 CERVICAL RADICULOPATHY: ICD-10-CM

## 2020-10-29 PROCEDURE — 97140 MANUAL THERAPY 1/> REGIONS: CPT | Performed by: PHYSICAL THERAPIST

## 2020-10-29 PROCEDURE — 97110 THERAPEUTIC EXERCISES: CPT | Performed by: PHYSICAL THERAPIST

## 2020-11-03 ENCOUNTER — APPOINTMENT (OUTPATIENT)
Dept: PHYSICAL THERAPY | Facility: CLINIC | Age: 55
End: 2020-11-03
Payer: COMMERCIAL

## 2020-11-06 ENCOUNTER — OFFICE VISIT (OUTPATIENT)
Dept: PHYSICAL THERAPY | Facility: CLINIC | Age: 55
End: 2020-11-06
Payer: COMMERCIAL

## 2020-11-06 DIAGNOSIS — G44.89 OTHER HEADACHE SYNDROME: ICD-10-CM

## 2020-11-06 DIAGNOSIS — G89.29 CHRONIC BILATERAL LOW BACK PAIN WITH BILATERAL SCIATICA: Primary | ICD-10-CM

## 2020-11-06 DIAGNOSIS — M54.12 CERVICAL RADICULOPATHY: ICD-10-CM

## 2020-11-06 DIAGNOSIS — M54.41 CHRONIC BILATERAL LOW BACK PAIN WITH BILATERAL SCIATICA: Primary | ICD-10-CM

## 2020-11-06 DIAGNOSIS — M54.42 CHRONIC BILATERAL LOW BACK PAIN WITH BILATERAL SCIATICA: Primary | ICD-10-CM

## 2020-11-06 PROCEDURE — 97530 THERAPEUTIC ACTIVITIES: CPT | Performed by: PHYSICAL THERAPIST

## 2020-11-06 PROCEDURE — 97112 NEUROMUSCULAR REEDUCATION: CPT | Performed by: PHYSICAL THERAPIST

## 2020-11-06 PROCEDURE — 97110 THERAPEUTIC EXERCISES: CPT | Performed by: PHYSICAL THERAPIST

## 2020-11-11 ENCOUNTER — OFFICE VISIT (OUTPATIENT)
Dept: NEUROLOGY | Facility: CLINIC | Age: 55
End: 2020-11-11
Payer: COMMERCIAL

## 2020-11-11 ENCOUNTER — APPOINTMENT (OUTPATIENT)
Dept: PHYSICAL THERAPY | Facility: CLINIC | Age: 55
End: 2020-11-11
Payer: COMMERCIAL

## 2020-11-11 VITALS
SYSTOLIC BLOOD PRESSURE: 120 MMHG | HEART RATE: 66 BPM | WEIGHT: 244 LBS | BODY MASS INDEX: 34.93 KG/M2 | DIASTOLIC BLOOD PRESSURE: 70 MMHG | HEIGHT: 70 IN

## 2020-11-11 DIAGNOSIS — G44.89 OTHER HEADACHE SYNDROME: ICD-10-CM

## 2020-11-11 DIAGNOSIS — G89.29 CHRONIC BILATERAL LOW BACK PAIN WITH BILATERAL SCIATICA: ICD-10-CM

## 2020-11-11 DIAGNOSIS — M54.42 CHRONIC BILATERAL LOW BACK PAIN WITH BILATERAL SCIATICA: ICD-10-CM

## 2020-11-11 DIAGNOSIS — M54.41 CHRONIC BILATERAL LOW BACK PAIN WITH BILATERAL SCIATICA: ICD-10-CM

## 2020-11-11 DIAGNOSIS — M54.12 CERVICAL RADICULOPATHY: Primary | ICD-10-CM

## 2020-11-11 PROCEDURE — 99214 OFFICE O/P EST MOD 30 MIN: CPT | Performed by: PSYCHIATRY & NEUROLOGY

## 2020-11-11 RX ORDER — GABAPENTIN 300 MG/1
CAPSULE ORAL
Qty: 30 CAPSULE | Refills: 5 | Status: SHIPPED | OUTPATIENT
Start: 2020-11-11

## 2020-11-11 RX ORDER — DIPHENOXYLATE HYDROCHLORIDE AND ATROPINE SULFATE 2.5; .025 MG/1; MG/1
1 TABLET ORAL DAILY
COMMUNITY

## 2021-01-08 ENCOUNTER — TELEPHONE (OUTPATIENT)
Dept: NEUROLOGY | Facility: CLINIC | Age: 56
End: 2021-01-08

## 2025-02-14 ENCOUNTER — OFFICE VISIT (OUTPATIENT)
Age: 60
End: 2025-02-14
Payer: COMMERCIAL

## 2025-02-14 VITALS
BODY MASS INDEX: 35.22 KG/M2 | RESPIRATION RATE: 18 BRPM | TEMPERATURE: 97.9 F | HEART RATE: 96 BPM | WEIGHT: 246 LBS | HEIGHT: 70 IN | OXYGEN SATURATION: 95 % | SYSTOLIC BLOOD PRESSURE: 134 MMHG | DIASTOLIC BLOOD PRESSURE: 76 MMHG

## 2025-02-14 DIAGNOSIS — R68.89 FLU-LIKE SYMPTOMS: Primary | ICD-10-CM

## 2025-02-14 PROCEDURE — S9083 URGENT CARE CENTER GLOBAL: HCPCS | Performed by: PHYSICIAN ASSISTANT

## 2025-02-14 PROCEDURE — G0382 LEV 3 HOSP TYPE B ED VISIT: HCPCS | Performed by: PHYSICIAN ASSISTANT

## 2025-02-14 RX ORDER — BROMPHENIRAMINE MALEATE, PSEUDOEPHEDRINE HYDROCHLORIDE, AND DEXTROMETHORPHAN HYDROBROMIDE 2; 30; 10 MG/5ML; MG/5ML; MG/5ML
10 SYRUP ORAL 4 TIMES DAILY PRN
Qty: 120 ML | Refills: 0 | Status: SHIPPED | OUTPATIENT
Start: 2025-02-14

## 2025-02-14 RX ORDER — BENZONATATE 100 MG/1
100 CAPSULE ORAL 3 TIMES DAILY PRN
Qty: 20 CAPSULE | Refills: 0 | Status: SHIPPED | OUTPATIENT
Start: 2025-02-14 | End: 2025-02-14
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
